# Patient Record
Sex: MALE | Race: WHITE | NOT HISPANIC OR LATINO | URBAN - METROPOLITAN AREA
[De-identification: names, ages, dates, MRNs, and addresses within clinical notes are randomized per-mention and may not be internally consistent; named-entity substitution may affect disease eponyms.]

---

## 2022-06-04 ENCOUNTER — TELEPHONE ENCOUNTER (OUTPATIENT)
Dept: URBAN - METROPOLITAN AREA CLINIC 68 | Facility: CLINIC | Age: 47
End: 2022-06-04

## 2022-06-04 RX ORDER — WARFARIN 10 MG/1
WARFARIN SODIUM( 10MG ORAL  DAILY ) INACTIVE -HX ENTRY TABLET ORAL DAILY
OUTPATIENT
Start: 2016-02-24

## 2022-06-04 RX ORDER — SILDENAFIL CITRATE 100 MG/1
VIAGRA( 100MG ORAL 1 DAILY ) UNDEFINED -HX ENTRY TABLET, FILM COATED ORAL DAILY
OUTPATIENT
Start: 2015-06-18

## 2022-06-04 RX ORDER — WARFARIN 5 MG/1
WARFARIN SODIUM( 5MG ORAL   ) UNDEFINED -HX ENTRY TABLET ORAL
OUTPATIENT
Start: 2015-06-18

## 2022-06-04 RX ORDER — BUTALBITAL, ACETAMINOPHEN, AND CAFFEINE 50; 325; 40 MG/1; MG/1; MG/1
BUTALBITAL-APAP-CAFFEINE( 50-325-40MG ORAL   ) UNDEFINED -HX ENTRY TABLET ORAL
OUTPATIENT
Start: 2015-06-18

## 2022-06-04 RX ORDER — TRAZODONE HYDROCHLORIDE 150 MG/1
TRAZODONE HCL( 150MG ORAL 1 DAILY ) INACTIVE -HX ENTRY TABLET ORAL DAILY
OUTPATIENT
Start: 2019-11-19

## 2022-06-04 RX ORDER — LIDOCAINE 50 MG/G
LIDOCAINE( 5% EXTERNAL   ) UNDEFINED -HX ENTRY PATCH CUTANEOUS
OUTPATIENT
Start: 2015-06-18

## 2022-06-04 RX ORDER — LEDIPASVIR AND SOFOSBUVIR 90; 400 MG/1; MG/1
TABLET, FILM COATED ORAL DAILY
Qty: 28 | Refills: 28 | OUTPATIENT
Start: 2016-10-20 | End: 2018-10-17

## 2022-06-04 RX ORDER — ENOXAPARIN SODIUM 100 MG/ML
ENOXAPARIN SODIUM( 100MG/ML SUBCUTANEOUS   ) UNDEFINED -HX ENTRY INJECTION SUBCUTANEOUS
OUTPATIENT
Start: 2015-06-18

## 2022-06-04 RX ORDER — LEVOFLOXACIN 500 MG/1
LEVOFLOXACIN( 500MG ORAL   ) UNDEFINED -HX ENTRY TABLET, FILM COATED ORAL
OUTPATIENT
Start: 2015-06-18

## 2022-06-04 RX ORDER — TRIAMCINOLONE ACETONIDE 0.25 MG/G
TRIAMCINOLONE ACETONIDE( 0.025% EXTERNAL   ) UNDEFINED -HX ENTRY CREAM TOPICAL
OUTPATIENT
Start: 2015-06-18

## 2022-06-04 RX ORDER — KETOCONAZOLE 20 MG/G
KETOCONAZOLE( 2% EXTERNAL   ) UNDEFINED -HX ENTRY CREAM TOPICAL
OUTPATIENT
Start: 2015-06-18

## 2022-06-04 RX ORDER — CEPHALEXIN 500 MG/1
KEFLEX( 500MG ORAL 2 EVERY SIX HOURS ) INACTIVE -HX ENTRY CAPSULE ORAL
OUTPATIENT
Start: 2016-02-24

## 2022-06-04 RX ORDER — FLUTICASONE PROPIONATE 50 UG/1
FLUTICASONE PROPIONATE( 50MCG/ACT NASAL   ) INACTIVE -HX ENTRY SPRAY, METERED NASAL
OUTPATIENT
Start: 2018-04-05

## 2022-06-04 RX ORDER — ENOXAPARIN SODIUM 100 MG/ML
LOVENOX( 100MG/ML SUBCUTANEOUS  DAILY ) INACTIVE -HX ENTRY INJECTION SUBCUTANEOUS DAILY
OUTPATIENT
Start: 2016-02-24

## 2022-06-04 RX ORDER — ENOXAPARIN SODIUM 80 MG/.8ML
ENOXAPARIN SODIUM( 80MG/0.8ML SUBCUTANEOUS   ) UNDEFINED -HX ENTRY INJECTION SUBCUTANEOUS
OUTPATIENT
Start: 2015-06-18

## 2022-06-05 ENCOUNTER — TELEPHONE ENCOUNTER (OUTPATIENT)
Dept: URBAN - METROPOLITAN AREA CLINIC 68 | Facility: CLINIC | Age: 47
End: 2022-06-05

## 2022-06-05 RX ORDER — RIVAROXABAN 20 MG/1
XARELTO( 20MG ORAL  DAILY ) ACTIVE -HX ENTRY TABLET, FILM COATED ORAL DAILY
Status: ACTIVE | COMMUNITY
Start: 2019-11-19

## 2022-06-05 RX ORDER — LORAZEPAM 2 MG/1
LORAZEPAM( 2MG ORAL 1 AS NEEDED ) ACTIVE -HX ENTRY TABLET ORAL AS NEEDED
Status: ACTIVE | COMMUNITY
Start: 2019-11-19

## 2022-06-05 RX ORDER — OXYCODONE HYDROCHLORIDE 15 MG/1
OXYCODONE HCL( 15MG ORAL 1 EVERY 4 HOURS ) ACTIVE -HX ENTRY TABLET ORAL EVERY 4 HOURS
Status: ACTIVE | COMMUNITY
Start: 2019-11-19

## 2022-06-25 ENCOUNTER — TELEPHONE ENCOUNTER (OUTPATIENT)
Age: 47
End: 2022-06-25

## 2022-06-25 RX ORDER — LIDOCAINE 50 MG/G
LIDOCAINE( 5% EXTERNAL   ) UNDEFINED -HX ENTRY PATCH TOPICAL
OUTPATIENT
Start: 2015-06-18

## 2022-06-25 RX ORDER — LEVOFLOXACIN 500 MG/1
LEVOFLOXACIN( 500MG ORAL   ) UNDEFINED -HX ENTRY TABLET, FILM COATED ORAL
OUTPATIENT
Start: 2015-06-18

## 2022-06-25 RX ORDER — WARFARIN 10 MG/1
WARFARIN SODIUM( 10MG ORAL  DAILY ) INACTIVE -HX ENTRY TABLET ORAL DAILY
OUTPATIENT
Start: 2016-02-24

## 2022-06-25 RX ORDER — WARFARIN 5 MG/1
WARFARIN SODIUM( 5MG ORAL   ) UNDEFINED -HX ENTRY TABLET ORAL
OUTPATIENT
Start: 2015-06-18

## 2022-06-25 RX ORDER — TRAZODONE HYDROCHLORIDE 150 MG/1
TRAZODONE HCL( 150MG ORAL 1 DAILY ) INACTIVE -HX ENTRY TABLET ORAL DAILY
OUTPATIENT
Start: 2019-11-19

## 2022-06-25 RX ORDER — SILDENAFIL CITRATE 100 MG/1
VIAGRA( 100MG ORAL 1 DAILY ) UNDEFINED -HX ENTRY TABLET, FILM COATED ORAL DAILY
OUTPATIENT
Start: 2015-06-18

## 2022-06-25 RX ORDER — IBUPROFEN 800 MG/1
THSC IBUPROFEN( 600MG ORAL   ) UNDEFINED -HX ENTRY TABLET, FILM COATED ORAL
OUTPATIENT
Start: 2015-06-18

## 2022-06-25 RX ORDER — KETOCONAZOLE 20 MG/G
KETOCONAZOLE( 2% EXTERNAL   ) UNDEFINED -HX ENTRY CREAM TOPICAL
OUTPATIENT
Start: 2015-06-18

## 2022-06-25 RX ORDER — FLUTICASONE PROPIONATE 50 UG/1
FLUTICASONE PROPIONATE( 50MCG/ACT NASAL   ) INACTIVE -HX ENTRY SPRAY, METERED NASAL
OUTPATIENT
Start: 2018-04-05

## 2022-06-25 RX ORDER — TRIAMCINOLONE ACETONIDE 0.25 MG/G
TRIAMCINOLONE ACETONIDE( 0.025% EXTERNAL   ) UNDEFINED -HX ENTRY CREAM TOPICAL
OUTPATIENT
Start: 2015-06-18

## 2022-06-25 RX ORDER — BUTALBITAL, ACETAMINOPHEN, AND CAFFEINE 50; 300; 40 MG/1; MG/1; MG/1
FIORICET( 50-325-40MG ORAL  AS DIRECTED ) INACTIVE -HX ENTRY CAPSULE ORAL AS DIRECTED
OUTPATIENT
Start: 2018-10-17

## 2022-06-25 RX ORDER — BUTALBITAL, ACETAMINOPHEN, AND CAFFEINE 50; 325; 40 MG/1; MG/1; MG/1
BUTALBITAL-APAP-CAFFEINE( 50-325-40MG ORAL   ) UNDEFINED -HX ENTRY TABLET ORAL
OUTPATIENT
Start: 2015-06-18

## 2022-06-25 RX ORDER — LEDIPASVIR AND SOFOSBUVIR 90; 400 MG/1; MG/1
TABLET, FILM COATED ORAL DAILY
Qty: 28 | Refills: 28 | OUTPATIENT
Start: 2016-10-20 | End: 2018-10-17

## 2022-06-26 ENCOUNTER — TELEPHONE ENCOUNTER (OUTPATIENT)
Age: 47
End: 2022-06-26

## 2022-06-26 RX ORDER — LORAZEPAM 2 MG/1
LORAZEPAM( 2MG ORAL 1 AS NEEDED ) ACTIVE -HX ENTRY TABLET ORAL AS NEEDED
Status: ACTIVE | COMMUNITY
Start: 2019-11-19

## 2022-06-26 RX ORDER — RIVAROXABAN 20 MG/1
XARELTO( 20MG ORAL  DAILY ) ACTIVE -HX ENTRY TABLET, FILM COATED ORAL DAILY
Status: ACTIVE | COMMUNITY
Start: 2019-11-19

## 2022-06-26 RX ORDER — OXYCODONE HYDROCHLORIDE 15 MG/1
OXYCODONE HCL( 15MG ORAL 1 EVERY 4 HOURS ) ACTIVE -HX ENTRY TABLET ORAL EVERY 4 HOURS
Status: ACTIVE | COMMUNITY
Start: 2019-11-19

## 2023-01-19 ENCOUNTER — OFFICE VISIT (OUTPATIENT)
Dept: URBAN - METROPOLITAN AREA CLINIC 68 | Facility: CLINIC | Age: 48
End: 2023-01-19

## 2023-01-19 RX ORDER — RIVAROXABAN 20 MG/1
XARELTO( 20MG ORAL  DAILY ) ACTIVE -HX ENTRY TABLET, FILM COATED ORAL DAILY
Status: ACTIVE | COMMUNITY
Start: 2019-11-19

## 2023-01-19 RX ORDER — ESOMEPRAZOLE MAGNESIUM 40 MG/1
1 CAPSULE CAPSULE, DELAYED RELEASE ORAL ONCE A DAY
Qty: 90 CAPSULE | Refills: 3 | OUTPATIENT
Start: 2023-01-19

## 2023-01-19 RX ORDER — OXYCODONE HYDROCHLORIDE 30 MG/1
OXYCODONE HCL( 15MG ORAL 1 EVERY 4 HOURS ) ACTIVE -HX ENTRY TABLET ORAL EVERY 4 HOURS
Status: ACTIVE | COMMUNITY
Start: 2019-11-19 | End: 2023-01-19

## 2023-01-19 RX ORDER — LORAZEPAM 2 MG/1
LORAZEPAM( 2MG ORAL 1 AS NEEDED ) ACTIVE -HX ENTRY TABLET ORAL AS NEEDED
Status: ACTIVE | COMMUNITY
Start: 2019-11-19

## 2023-01-19 NOTE — EXAM-MIGRATED EXAMINATIONS
General Examination: General appearance: -> alert, pleasant, well-nourished and in no acute distress   Head: -> normocephalic, atraumatic   Eyes: -> pupils equal, round, reactive to light and accommodation   Ears: -> normal   Nose: -> no lesions   Oral cavity: -> normal , mucosa moist , tongue is midline , with good dentition   Chest: -> chest wall with no costochondral junction tenderness, no rib deformity and normal shape and expansion   Abdomen: -> soft with good bowel sounds, nontender, and no masses or hepatosplenomegaly prominent vascular markings, not caput medusa   Rectal: -> not examined   Musculoskeletal: -> no swelling, redness, warmth or tenderness of the shoulder(s) with full range of motion   Extremities: -> normal extremity with no clubbing, cyanosis or edema   Neurologic: -> nonfocal , alert and oriented   Throat: -> clear   Neck / thyroid: -> neck is supple, with full range of motion and no cervical lymphadenopathy , no masses and/or tenderness , no jugular venous distention , trachea midline   Lymph nodes: -> no axillary, supraclavicular or inguinal lymphadenopathy   Skin: -> skin is warm and dry, with no rashes, good skin turgor and normal hair distribution   Heart: -> regular rate and rhythm without murmurs, gallops, clicks or rubs   Lungs: -> clear to auscultation bilaterally, with good air movement and no rales, rhonchi or wheezes

## 2023-01-19 NOTE — HPI-MIGRATED HPI
General : History of dysphagia , after eating , feels obstruction from food bolus  resolved for 6 months  Takes no medicine  smoking cigarettes 2-3 packs patches  drinks 6 beers daily was at rehab previously

## 2023-02-01 ENCOUNTER — LAB OUTSIDE AN ENCOUNTER (OUTPATIENT)
Dept: URBAN - METROPOLITAN AREA CLINIC 68 | Facility: CLINIC | Age: 48
End: 2023-02-01

## 2023-02-02 LAB
(ALWAYS MESSAGE): (no result)
ABSOLUTE BASOPHILS: 62
ABSOLUTE EOSINOPHILS: 403
ABSOLUTE LYMPHOCYTES: 2238
ABSOLUTE MONOCYTES: 577
ABSOLUTE NEUTROPHILS: 2920
ALBUMIN/GLOBULIN RATIO: 1.6
ALBUMIN: 4.2
ALKALINE PHOSPHATASE: 72
ALT: 18
AST: 24
BASOPHILS: 1
BILIRUBIN, DIRECT: 0.1
BILIRUBIN, INDIRECT: 0.3
BILIRUBIN, TOTAL: 0.4
EOSINOPHILS: 6.5
GLOBULIN: 2.6
HEMATOCRIT: 44.5
HEMOGLOBIN: 15.1
INR: 1.2
LYMPHOCYTES: 36.1
MCH: 31.9
MCHC: 33.9
MCV: 94.1
MONOCYTES: 9.3
MPV: 9.6
NEUTROPHILS: 47.1
PARTIAL THROMBOPLASTIN TIME, ACTIVATED: 38
PLATELET COUNT: 254
PROTEIN, TOTAL: 6.8
PT: 11.4
RDW: 11.5
RED BLOOD CELL COUNT: 4.73
WHITE BLOOD CELL COUNT: 6.2

## 2023-02-14 ENCOUNTER — OFFICE VISIT (OUTPATIENT)
Dept: URBAN - METROPOLITAN AREA CLINIC 68 | Facility: CLINIC | Age: 48
End: 2023-02-14

## 2023-02-17 ENCOUNTER — OFFICE VISIT (OUTPATIENT)
Dept: URBAN - METROPOLITAN AREA CLINIC 68 | Facility: CLINIC | Age: 48
End: 2023-02-17

## 2023-03-03 ENCOUNTER — OFFICE VISIT (OUTPATIENT)
Dept: URBAN - METROPOLITAN AREA SURGERY CENTER 12 | Facility: SURGERY CENTER | Age: 48
End: 2023-03-03

## 2023-03-03 RX ORDER — OMEPRAZOLE 20 MG/1
1 CAPSULE TABLET, DELAYED RELEASE ORAL ONCE A DAY
Qty: 30 CAPSULE | Refills: 10 | OUTPATIENT
Start: 2023-03-06

## 2023-03-03 RX ORDER — RIVAROXABAN 20 MG/1
XARELTO( 20MG ORAL  DAILY ) ACTIVE -HX ENTRY TABLET, FILM COATED ORAL DAILY
Status: ACTIVE | COMMUNITY
Start: 2019-11-19

## 2023-03-03 RX ORDER — ESOMEPRAZOLE MAGNESIUM 40 MG/1
1 CAPSULE CAPSULE, DELAYED RELEASE ORAL ONCE A DAY
Qty: 90 CAPSULE | Refills: 3 | Status: ACTIVE | COMMUNITY
Start: 2023-01-19

## 2023-03-03 RX ORDER — LORAZEPAM 2 MG/1
LORAZEPAM( 2MG ORAL 1 AS NEEDED ) ACTIVE -HX ENTRY TABLET ORAL AS NEEDED
Status: ACTIVE | COMMUNITY
Start: 2019-11-19

## 2023-03-07 ENCOUNTER — OFFICE VISIT (OUTPATIENT)
Dept: URBAN - METROPOLITAN AREA CLINIC 68 | Facility: CLINIC | Age: 48
End: 2023-03-07

## 2023-05-01 ENCOUNTER — OFFICE VISIT (OUTPATIENT)
Dept: URBAN - METROPOLITAN AREA CLINIC 68 | Facility: CLINIC | Age: 48
End: 2023-05-01

## 2024-03-08 ENCOUNTER — LAB (OUTPATIENT)
Dept: URBAN - METROPOLITAN AREA CLINIC 68 | Facility: CLINIC | Age: 49
End: 2024-03-08

## 2024-03-08 ENCOUNTER — OV EP (OUTPATIENT)
Dept: URBAN - METROPOLITAN AREA CLINIC 68 | Facility: CLINIC | Age: 49
End: 2024-03-08
Payer: COMMERCIAL

## 2024-03-08 VITALS
BODY MASS INDEX: 24.5 KG/M2 | HEART RATE: 101 BPM | OXYGEN SATURATION: 98 % | WEIGHT: 175 LBS | HEIGHT: 71 IN | SYSTOLIC BLOOD PRESSURE: 138 MMHG | DIASTOLIC BLOOD PRESSURE: 80 MMHG

## 2024-03-08 DIAGNOSIS — F10.11 HISTORY OF ETOH ABUSE: ICD-10-CM

## 2024-03-08 DIAGNOSIS — R10.84 GENERALIZED ABDOMINAL PAIN: ICD-10-CM

## 2024-03-08 DIAGNOSIS — Z86.711 HISTORY OF PULMONARY EMBOLISM: ICD-10-CM

## 2024-03-08 DIAGNOSIS — Z12.11 COLON CANCER SCREENING: ICD-10-CM

## 2024-03-08 PROBLEM — 305058001: Status: ACTIVE | Noted: 2024-03-08

## 2024-03-08 PROBLEM — 371434005: Status: ACTIVE | Noted: 2024-03-08

## 2024-03-08 PROBLEM — 102614006: Status: ACTIVE | Noted: 2024-03-08

## 2024-03-08 PROCEDURE — 99214 OFFICE O/P EST MOD 30 MIN: CPT

## 2024-03-08 RX ORDER — ESOMEPRAZOLE MAGNESIUM 40 MG/1
1 CAPSULE CAPSULE, DELAYED RELEASE ORAL ONCE A DAY
Qty: 90 CAPSULE | Refills: 3 | Status: ON HOLD | COMMUNITY
Start: 2023-01-19

## 2024-03-08 RX ORDER — OMEPRAZOLE 20 MG/1
1 CAPSULE TABLET, DELAYED RELEASE ORAL ONCE A DAY
Qty: 30 CAPSULE | Refills: 10 | Status: ACTIVE | COMMUNITY
Start: 2023-03-06

## 2024-03-08 RX ORDER — SODIUM PICOSULFATE, MAGNESIUM OXIDE, AND ANHYDROUS CITRIC ACID 12; 3.5; 1 G/175ML; G/175ML; MG/175ML
175 ML THE FIRST DOSE THE EVENING BEFORE AND SECOND DOSE THE MORNING OF COLONOSCOPY LIQUID ORAL ONCE A DAY
Qty: 350 | OUTPATIENT
Start: 2024-03-08 | End: 2024-03-10

## 2024-03-08 RX ORDER — LORAZEPAM 2 MG/1
LORAZEPAM( 2MG ORAL 1 AS NEEDED ) ACTIVE -HX ENTRY TABLET ORAL AS NEEDED
Status: ACTIVE | COMMUNITY
Start: 2019-11-19

## 2024-03-08 RX ORDER — RIVAROXABAN 20 MG/1
XARELTO( 20MG ORAL  DAILY ) ACTIVE -HX ENTRY TABLET, FILM COATED ORAL DAILY
Status: ACTIVE | COMMUNITY
Start: 2019-11-19

## 2024-03-08 NOTE — PHYSICAL EXAM CARDIOVASCULAR:
no edema Gabapentin Pregnancy And Lactation Text: This medication is Pregnancy Category C and isn't considered safe during pregnancy. It is excreted in breast milk.

## 2024-03-08 NOTE — HPI-TODAY'S VISIT:
Patient presents due to subjective LUQ bulge which is not noted on physical exam.  He states he noticed this feeling 2-3 months ago and does not feel right. He also describes non-specific intemittent abdominal pains. He is recommended abd/pelvic CT for further evaluation. Patient is due for colon cancer screening and has never had a colonoscopy before. He has history of dysphagia with subsequent EGD 3/2023 found with rodriguez's esophagus. He continues on Omeprazole 20mg/d and denies dysphagia today. Patient does smoke cigarettes (2-3 packs daily) and describes having 6 beers daily with prior rehab encounter. He has hx of PE (2021) and is prescribed Xarelto 20mg/d by his PCP. Per Dr. Car he is recommended to hold Xarelto for 2 days prior to colonoscopy. He did have abdominal US and fibroscan without significant findings. He denies nausea/vomiting, dysphagia, abdominal pain, melena, rectal bleeding, diarrhea, constipation, weight loss, fever.

## 2024-03-15 ENCOUNTER — COLON (OUTPATIENT)
Dept: URBAN - METROPOLITAN AREA SURGERY CENTER 12 | Facility: SURGERY CENTER | Age: 49
End: 2024-03-15
Payer: COMMERCIAL

## 2024-03-15 ENCOUNTER — LAB (OUTPATIENT)
Dept: URBAN - METROPOLITAN AREA CLINIC 4 | Facility: CLINIC | Age: 49
End: 2024-03-15
Payer: COMMERCIAL

## 2024-03-15 DIAGNOSIS — K63.5 POLYP OF SIGMOID COLON, UNSPECIFIED TYPE: ICD-10-CM

## 2024-03-15 DIAGNOSIS — D12.5 BENIGN NEOPLASM OF SIGMOID COLON: ICD-10-CM

## 2024-03-15 DIAGNOSIS — Z12.11 ENCOUNTER FOR SCREENING FOR MALIGNANT NEOPLASM OF COLON: ICD-10-CM

## 2024-03-15 PROCEDURE — 45385 COLONOSCOPY W/LESION REMOVAL: CPT | Performed by: SPECIALIST

## 2024-03-15 PROCEDURE — 88305 TISSUE EXAM BY PATHOLOGIST: CPT | Performed by: PATHOLOGY

## 2024-03-15 RX ORDER — LORAZEPAM 2 MG/1
LORAZEPAM( 2MG ORAL 1 AS NEEDED ) ACTIVE -HX ENTRY TABLET ORAL AS NEEDED
Status: ACTIVE | COMMUNITY
Start: 2019-11-19

## 2024-03-15 RX ORDER — RIVAROXABAN 20 MG/1
XARELTO( 20MG ORAL  DAILY ) ACTIVE -HX ENTRY TABLET, FILM COATED ORAL DAILY
Status: ACTIVE | COMMUNITY
Start: 2019-11-19

## 2024-03-15 RX ORDER — ESOMEPRAZOLE MAGNESIUM 40 MG/1
1 CAPSULE CAPSULE, DELAYED RELEASE ORAL ONCE A DAY
Qty: 90 CAPSULE | Refills: 3 | Status: ON HOLD | COMMUNITY
Start: 2023-01-19

## 2024-03-15 RX ORDER — OMEPRAZOLE 20 MG/1
1 CAPSULE TABLET, DELAYED RELEASE ORAL ONCE A DAY
Qty: 30 CAPSULE | Refills: 10 | Status: ACTIVE | COMMUNITY
Start: 2023-03-06

## 2024-03-20 ENCOUNTER — OV EP (OUTPATIENT)
Dept: URBAN - METROPOLITAN AREA CLINIC 68 | Facility: CLINIC | Age: 49
End: 2024-03-20

## 2024-03-28 ENCOUNTER — OV CON (OUTPATIENT)
Dept: URBAN - METROPOLITAN AREA CLINIC 68 | Facility: CLINIC | Age: 49
End: 2024-03-28

## 2024-03-28 RX ORDER — ESOMEPRAZOLE MAGNESIUM 40 MG/1
1 CAPSULE CAPSULE, DELAYED RELEASE ORAL ONCE A DAY
Qty: 90 CAPSULE | Refills: 3 | Status: ON HOLD | COMMUNITY
Start: 2023-01-19

## 2024-03-28 RX ORDER — OMEPRAZOLE 20 MG/1
1 CAPSULE TABLET, DELAYED RELEASE ORAL ONCE A DAY
Qty: 30 CAPSULE | Refills: 10 | Status: ACTIVE | COMMUNITY
Start: 2023-03-06

## 2024-03-28 RX ORDER — RIVAROXABAN 20 MG/1
XARELTO( 20MG ORAL  DAILY ) ACTIVE -HX ENTRY TABLET, FILM COATED ORAL DAILY
Status: ACTIVE | COMMUNITY
Start: 2019-11-19

## 2024-03-28 RX ORDER — LORAZEPAM 2 MG/1
LORAZEPAM( 2MG ORAL 1 AS NEEDED ) ACTIVE -HX ENTRY TABLET ORAL AS NEEDED
Status: ACTIVE | COMMUNITY
Start: 2019-11-19

## 2024-04-01 ENCOUNTER — OV EP (OUTPATIENT)
Dept: URBAN - METROPOLITAN AREA CLINIC 68 | Facility: CLINIC | Age: 49
End: 2024-04-01
Payer: COMMERCIAL

## 2024-04-01 VITALS
SYSTOLIC BLOOD PRESSURE: 138 MMHG | WEIGHT: 175 LBS | BODY MASS INDEX: 24.5 KG/M2 | OXYGEN SATURATION: 98 % | HEIGHT: 71 IN | HEART RATE: 98 BPM | DIASTOLIC BLOOD PRESSURE: 82 MMHG

## 2024-04-01 DIAGNOSIS — Z12.11 COLON CANCER SCREENING: ICD-10-CM

## 2024-04-01 DIAGNOSIS — Z86.711 HISTORY OF PULMONARY EMBOLISM: ICD-10-CM

## 2024-04-01 DIAGNOSIS — R10.84 GENERALIZED ABDOMINAL PAIN: ICD-10-CM

## 2024-04-01 DIAGNOSIS — F10.11 HISTORY OF ETOH ABUSE: ICD-10-CM

## 2024-04-01 PROCEDURE — 99214 OFFICE O/P EST MOD 30 MIN: CPT | Performed by: SPECIALIST

## 2024-04-01 RX ORDER — ESOMEPRAZOLE MAGNESIUM 40 MG/1
1 CAPSULE CAPSULE, DELAYED RELEASE ORAL ONCE A DAY
Qty: 90 CAPSULE | Refills: 3 | Status: DISCONTINUED | COMMUNITY
Start: 2023-01-19

## 2024-04-01 RX ORDER — LORAZEPAM 2 MG/1
LORAZEPAM( 2MG ORAL 1 AS NEEDED ) ACTIVE -HX ENTRY TABLET ORAL AS NEEDED
Status: ACTIVE | COMMUNITY
Start: 2019-11-19

## 2024-04-01 RX ORDER — OMEPRAZOLE 20 MG/1
1 CAPSULE TABLET, DELAYED RELEASE ORAL ONCE A DAY
Qty: 30 CAPSULE | Refills: 10 | Status: ACTIVE | COMMUNITY
Start: 2023-03-06

## 2024-04-01 RX ORDER — RIVAROXABAN 20 MG/1
XARELTO( 20MG ORAL  DAILY ) ACTIVE -HX ENTRY TABLET, FILM COATED ORAL DAILY
Status: ACTIVE | COMMUNITY
Start: 2019-11-19

## 2024-04-01 RX ORDER — OMEPRAZOLE 20 MG/1
1 CAPSULE TABLET, DELAYED RELEASE ORAL ONCE A DAY
Qty: 30 | Refills: 0
Start: 2023-03-06

## 2024-04-01 RX ORDER — HYOSCYAMINE SULFATE 0.12 MG/1
1 TABLET UNDER THE TONGUE AND ALLOW TO DISSOLVE  AS NEEDED TABLET SUBLINGUAL THREE TIMES A DAY
Qty: 270 TABLET | Refills: 3 | OUTPATIENT
Start: 2024-04-01 | End: 2025-03-27

## 2024-04-01 NOTE — HPI-TODAY'S VISIT:
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  He states he noticed this feeling 2-3 months ago and does not feel right. He also describes non-specific intermittent abdominal pains. He is recommended ABD/pelvic CT for further evaluation. Patient is due for colon cancer screening and has never had a colonoscopy before. He has history of dysphagia with subsequent EGD 3/2023 found with Dawson's esophagus. He continues on Omeprazole 20mg/d and denies dysphagia today. Patient does smoke cigarettes (2-3 packs daily) and describes having 6 beers daily with prior rehab encounter. He has hx of PE () and is prescribed Xarelto 20mg/d by his PCP. Per Dr. Car he is recommended to hold Xarelto for 2 days prior to colonoscopy. He did have abdominal US and FibroScan without significant findings. He denies nausea/vomiting, dysphagia, abdominal pain, melena, rectal bleeding, diarrhea, constipation, weight loss, fever.

## 2024-05-06 ENCOUNTER — DASHBOARD ENCOUNTERS (OUTPATIENT)
Age: 49
End: 2024-05-06

## 2024-05-07 ENCOUNTER — TELEPHONE ENCOUNTER (OUTPATIENT)
Dept: URBAN - METROPOLITAN AREA CLINIC 68 | Facility: CLINIC | Age: 49
End: 2024-05-07

## 2024-05-07 RX ORDER — HYOSCYAMINE SULFATE 0.12 MG/1
1 TABLET UNDER THE TONGUE AND ALLOW TO DISSOLVE  AS NEEDED TABLET SUBLINGUAL THREE TIMES A DAY
Qty: 270 TABLET | Refills: 3
Start: 2024-04-01 | End: 2025-05-02

## 2024-05-09 ENCOUNTER — TELEPHONE ENCOUNTER (OUTPATIENT)
Dept: URBAN - METROPOLITAN AREA CLINIC 68 | Facility: CLINIC | Age: 49
End: 2024-05-09

## 2024-05-15 ENCOUNTER — OFFICE VISIT (OUTPATIENT)
Dept: URBAN - METROPOLITAN AREA CLINIC 68 | Facility: CLINIC | Age: 49
End: 2024-05-15

## 2024-05-15 RX ORDER — LORAZEPAM 2 MG/1
LORAZEPAM( 2MG ORAL 1 AS NEEDED ) ACTIVE -HX ENTRY TABLET ORAL AS NEEDED
Status: ACTIVE | COMMUNITY
Start: 2019-11-19

## 2024-05-15 RX ORDER — RIVAROXABAN 20 MG/1
XARELTO( 20MG ORAL  DAILY ) ACTIVE -HX ENTRY TABLET, FILM COATED ORAL DAILY
Status: ACTIVE | COMMUNITY
Start: 2019-11-19

## 2024-05-15 RX ORDER — HYOSCYAMINE SULFATE 0.12 MG/1
1 TABLET UNDER THE TONGUE AND ALLOW TO DISSOLVE  AS NEEDED TABLET SUBLINGUAL THREE TIMES A DAY
Qty: 270 TABLET | Refills: 3 | Status: ACTIVE | COMMUNITY
Start: 2024-04-01 | End: 2025-05-02

## 2024-05-15 RX ORDER — OMEPRAZOLE 20 MG/1
1 CAPSULE TABLET, DELAYED RELEASE ORAL ONCE A DAY
Qty: 30 | Refills: 0 | Status: ACTIVE | COMMUNITY
Start: 2023-03-06

## 2025-01-02 ENCOUNTER — OFFICE VISIT (OUTPATIENT)
Dept: URBAN - METROPOLITAN AREA CLINIC 68 | Facility: CLINIC | Age: 50
End: 2025-01-02
Payer: COMMERCIAL

## 2025-01-02 ENCOUNTER — LAB OUTSIDE AN ENCOUNTER (OUTPATIENT)
Dept: URBAN - METROPOLITAN AREA CLINIC 68 | Facility: CLINIC | Age: 50
End: 2025-01-02

## 2025-01-02 VITALS
HEIGHT: 71 IN | BODY MASS INDEX: 23.8 KG/M2 | DIASTOLIC BLOOD PRESSURE: 82 MMHG | WEIGHT: 170 LBS | SYSTOLIC BLOOD PRESSURE: 124 MMHG

## 2025-01-02 DIAGNOSIS — R10.84 GENERALIZED ABDOMINAL PAIN: ICD-10-CM

## 2025-01-02 DIAGNOSIS — K22.70 BARRETT'S ESOPHAGUS WITHOUT DYSPLASIA: ICD-10-CM

## 2025-01-02 DIAGNOSIS — F10.11 HISTORY OF ETOH ABUSE: ICD-10-CM

## 2025-01-02 DIAGNOSIS — Z86.711 HISTORY OF PULMONARY EMBOLISM: ICD-10-CM

## 2025-01-02 DIAGNOSIS — K21.9 GERD: ICD-10-CM

## 2025-01-02 PROBLEM — 302914006: Status: ACTIVE | Noted: 2025-01-02

## 2025-01-02 PROCEDURE — 99214 OFFICE O/P EST MOD 30 MIN: CPT | Performed by: SPECIALIST

## 2025-01-02 RX ORDER — RIVAROXABAN 20 MG/1
XARELTO( 20MG ORAL  DAILY ) ACTIVE -HX ENTRY TABLET, FILM COATED ORAL DAILY
Status: ACTIVE | COMMUNITY
Start: 2019-11-19

## 2025-01-02 RX ORDER — LORAZEPAM 2 MG/1
LORAZEPAM( 2MG ORAL 1 AS NEEDED ) ACTIVE -HX ENTRY TABLET ORAL AS NEEDED
Status: ACTIVE | COMMUNITY
Start: 2019-11-19

## 2025-01-02 RX ORDER — OMEPRAZOLE 20 MG/1
1 CAPSULE TABLET, DELAYED RELEASE ORAL ONCE A DAY
Qty: 30 | Refills: 0 | Status: ACTIVE | COMMUNITY
Start: 2023-03-06

## 2025-01-02 RX ORDER — HYOSCYAMINE SULFATE 0.12 MG/1
1 TABLET UNDER THE TONGUE AND ALLOW TO DISSOLVE  AS NEEDED TABLET SUBLINGUAL THREE TIMES A DAY
Qty: 270 TABLET | Refills: 3 | Status: ACTIVE | COMMUNITY
Start: 2024-04-01 | End: 2025-05-02

## 2025-01-02 RX ORDER — OMEPRAZOLE 20 MG/1
1 CAPSULE TABLET, DELAYED RELEASE ORAL ONCE A DAY
OUTPATIENT
Start: 2023-03-06

## 2025-01-02 NOTE — HPI-TODAY'S VISIT:
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  He states he noticed this feeling 2-3 months ago and does not feel right. He also describes non-specific intermittent abdominal pains. He is recommended ABD/pelvic CT for further evaluation. Patient is due for colon cancer screening and has never had a colonoscopy before. He has history of dysphagia with subsequent EGD 3/2023 found with Dawson's esophagus. He continues on Omeprazole 20mg/d and denies dysphagia today. Patient does smoke cigarettes (2-3 packs daily) and describes having 6 beers daily with prior rehab encounter. He has hx of PE () and is prescribed Xarelto 20mg/d by his PCP. Per Dr. Car he is recommended to hold Xarelto for 2 days prior to colonoscopy. He did have abdominal US and FibroScan without significant findings. He denies nausea/vomiting, dysphagia, abdominal pain, melena, rectal bleeding, diarrhea, constipation, weight loss, fever.

## 2025-01-31 ENCOUNTER — OFFICE VISIT (OUTPATIENT)
Dept: URBAN - METROPOLITAN AREA SURGERY CENTER 12 | Facility: SURGERY CENTER | Age: 50
End: 2025-01-31

## 2025-02-21 ENCOUNTER — OFFICE VISIT (OUTPATIENT)
Dept: URBAN - METROPOLITAN AREA SURGERY CENTER 12 | Facility: SURGERY CENTER | Age: 50
End: 2025-02-21

## 2025-03-28 ENCOUNTER — OFFICE VISIT (OUTPATIENT)
Dept: URBAN - METROPOLITAN AREA SURGERY CENTER 12 | Facility: SURGERY CENTER | Age: 50
End: 2025-03-28

## 2025-03-28 RX ORDER — HYOSCYAMINE SULFATE 0.12 MG/1
1 TABLET UNDER THE TONGUE AND ALLOW TO DISSOLVE  AS NEEDED TABLET SUBLINGUAL THREE TIMES A DAY
Qty: 270 TABLET | Refills: 3 | Status: ACTIVE | COMMUNITY
Start: 2024-04-01 | End: 2025-05-02

## 2025-03-28 RX ORDER — LORAZEPAM 2 MG/1
LORAZEPAM( 2MG ORAL 1 AS NEEDED ) ACTIVE -HX ENTRY TABLET ORAL AS NEEDED
Status: ACTIVE | COMMUNITY
Start: 2019-11-19

## 2025-03-28 RX ORDER — OMEPRAZOLE 20 MG/1
1 CAPSULE TABLET, DELAYED RELEASE ORAL ONCE A DAY
Status: ACTIVE | COMMUNITY
Start: 2023-03-06

## 2025-03-28 RX ORDER — RIVAROXABAN 20 MG/1
XARELTO( 20MG ORAL  DAILY ) ACTIVE -HX ENTRY TABLET, FILM COATED ORAL DAILY
Status: ACTIVE | COMMUNITY
Start: 2019-11-19

## 2025-04-14 ENCOUNTER — OFFICE VISIT (OUTPATIENT)
Dept: URBAN - METROPOLITAN AREA CLINIC 68 | Facility: CLINIC | Age: 50
End: 2025-04-14

## 2025-04-14 RX ORDER — OMEPRAZOLE 20 MG/1
1 CAPSULE TABLET, DELAYED RELEASE ORAL ONCE A DAY
COMMUNITY
Start: 2023-03-06

## 2025-04-14 RX ORDER — LORAZEPAM 2 MG/1
LORAZEPAM( 2MG ORAL 1 AS NEEDED ) ACTIVE -HX ENTRY TABLET ORAL AS NEEDED
COMMUNITY
Start: 2019-11-19

## 2025-04-14 RX ORDER — HYOSCYAMINE SULFATE 0.12 MG/1
1 TABLET UNDER THE TONGUE AND ALLOW TO DISSOLVE  AS NEEDED TABLET SUBLINGUAL THREE TIMES A DAY
Qty: 270 TABLET | Refills: 3 | COMMUNITY
Start: 2024-04-01 | End: 2025-05-02

## 2025-04-14 RX ORDER — RIVAROXABAN 20 MG/1
XARELTO( 20MG ORAL  DAILY ) ACTIVE -HX ENTRY TABLET, FILM COATED ORAL DAILY
COMMUNITY
Start: 2019-11-19

## 2025-04-16 ENCOUNTER — OFFICE VISIT (OUTPATIENT)
Dept: URBAN - METROPOLITAN AREA CLINIC 68 | Facility: CLINIC | Age: 50
End: 2025-04-16

## 2025-04-16 RX ORDER — RIVAROXABAN 20 MG/1
XARELTO( 20MG ORAL  DAILY ) ACTIVE -HX ENTRY TABLET, FILM COATED ORAL DAILY
Status: ACTIVE | COMMUNITY
Start: 2019-11-19

## 2025-04-16 RX ORDER — OMEPRAZOLE 20 MG/1
1 CAPSULE TABLET, DELAYED RELEASE ORAL ONCE A DAY
Status: ACTIVE | COMMUNITY
Start: 2023-03-06

## 2025-04-16 RX ORDER — LORAZEPAM 2 MG/1
LORAZEPAM( 2MG ORAL 1 AS NEEDED ) ACTIVE -HX ENTRY TABLET ORAL AS NEEDED
Status: ACTIVE | COMMUNITY
Start: 2019-11-19

## 2025-04-16 RX ORDER — PAROXETINE HYDROCHLORIDE HEMIHYDRATE 20 MG/1
1 TABLET IN THE MORNING TABLET, FILM COATED ORAL ONCE A DAY
Status: ACTIVE | COMMUNITY

## 2025-04-16 RX ORDER — HYOSCYAMINE SULFATE 0.12 MG/1
1 TABLET UNDER THE TONGUE AND ALLOW TO DISSOLVE  AS NEEDED TABLET SUBLINGUAL THREE TIMES A DAY
Qty: 270 TABLET | Refills: 3 | Status: ON HOLD | COMMUNITY
Start: 2024-04-01 | End: 2025-05-02

## 2025-04-16 RX ORDER — ESOMEPRAZOLE MAGNESIUM 40 MG/1
1 CAPSULE CAPSULE, DELAYED RELEASE PELLETS ORAL ONCE A DAY
Qty: 30 CAPSULE | Refills: 11 | OUTPATIENT
Start: 2025-04-16

## 2025-04-16 RX ORDER — CALCIUM CARB/VITAMIN D3/VIT K1 500-500-40
AS DIRECTED TABLET,CHEWABLE ORAL
Status: ACTIVE | COMMUNITY

## 2025-04-16 RX ORDER — OMEPRAZOLE 20 MG/1
1 CAPSULE TABLET, DELAYED RELEASE ORAL ONCE A DAY
OUTPATIENT
Start: 2023-03-06

## 2025-06-18 ENCOUNTER — OFFICE VISIT (OUTPATIENT)
Dept: URBAN - METROPOLITAN AREA CLINIC 68 | Facility: CLINIC | Age: 50
End: 2025-06-18

## 2025-06-18 RX ORDER — PAROXETINE HYDROCHLORIDE HEMIHYDRATE 20 MG/1
1 TABLET IN THE MORNING TABLET, FILM COATED ORAL ONCE A DAY
Status: ACTIVE | COMMUNITY

## 2025-06-18 RX ORDER — ESOMEPRAZOLE MAGNESIUM 40 MG/1
1 CAPSULE CAPSULE, DELAYED RELEASE PELLETS ORAL ONCE A DAY
Qty: 30 CAPSULE | Refills: 11 | Status: ACTIVE | COMMUNITY
Start: 2025-04-16

## 2025-06-18 RX ORDER — DOXYCYCLINE 40 MG/1
1 CAPSULE IN THE MORNING ON AN EMPTY STOMACH CAPSULE ORAL ONCE A DAY
Status: ACTIVE | COMMUNITY

## 2025-06-18 RX ORDER — RIVAROXABAN 20 MG/1
XARELTO( 20MG ORAL  DAILY ) ACTIVE -HX ENTRY TABLET, FILM COATED ORAL DAILY
Status: ACTIVE | COMMUNITY
Start: 2019-11-19

## 2025-06-18 RX ORDER — LORAZEPAM 2 MG/1
LORAZEPAM( 2MG ORAL 1 AS NEEDED ) ACTIVE -HX ENTRY TABLET ORAL
Status: ACTIVE | COMMUNITY
Start: 2019-11-19

## 2025-06-18 RX ORDER — CALCIUM CARB/VITAMIN D3/VIT K1 500-500-40
AS DIRECTED TABLET,CHEWABLE ORAL
Status: ACTIVE | COMMUNITY

## 2025-06-18 NOTE — HPI-TODAY'S VISIT:
Cant quit smoking , chain smoking , and drinks caffeine dysphagia eolved   PLAN Piotr , cont esomeprazo    8PRIOR 25 PCP is DR Burgos, told him not to come back after an argument Pt has a lot of stress and litigation  Evaluation by psych reduded lorazepam to tid , was 2mg bid now 1 mg tid  Sleeps at night , early awakening 1 am , even with trazodone  SP egd w 48 FR dilation no tear  newPOS for Barretts   some improvement , no complete obstruction , some dysphagia persits    PLAN change to esomeprazole 40 , no smoking  limit caffeine  stress reduction  OV 6-8 weeks if dysphagia dil to 51/ 17 mm     25 Lately more pain LUQ  Still smoking, has a patch for nicotine more caffeine for low energy lately up to 7-8  cups daily  NO beer for one year  Less coffee now  Trying to quit smoking Due for Barretts re egd I reviewed labs and CT no significant GI findings s except the hiatal hernia   Had dvt and PE  no more PE after xarelto  23  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  He states he noticed this feeling 2-3 months ago and does not feel right. He also describes non-specific intermittent abdominal pains. He is recommended ABD/pelvic CT for further evaluation. Patient is due for colon cancer screening and has never had a colonoscopy before. He has history of dysphagia with subsequent EGD 3/2023 found with Dawson's esophagus. He continues on Omeprazole 20mg/d and denies dysphagia today. Patient does smoke cigarettes (2-3 packs daily) and describes having 6 beers daily with prior rehab encounter. He has hx of PE () and is prescribed Xarelto 20mg/d by his PCP. Per Dr. Car he is recommended to hold Xarelto for 2 days prior to colonoscopy. He did have abdominal US and FibroScan without significant findings. He denies nausea/vomiting, dysphagia, abdominal pain, melena, rectal bleeding, diarrhea, constipation, weight loss, fever.

## 2025-06-25 ENCOUNTER — TELEPHONE ENCOUNTER (OUTPATIENT)
Dept: URBAN - METROPOLITAN AREA CLINIC 68 | Facility: CLINIC | Age: 50
End: 2025-06-25